# Patient Record
(demographics unavailable — no encounter records)

---

## 2025-05-21 NOTE — HISTORY OF PRESENT ILLNESS
[FreeTextEntry1] : New patient-Comprehensive to establish care [de-identified] : ZAHRA GRIMES is a 25-year-old F with past medical history as below who presents today to the office as new patient annual wellness visit and to establish care. Patient has relevant history of anxiety, depression, adult ADHD and sees NP. She is currently taking Vraylar 1.5mg, lamotrigine 150 mg, oxybutynin ER 10mg, Vyanse 50mg, Vitamin D 5000IU, sertraline 100mg and Albuterol prn. 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    Surgical HX: strabismus correction Social HX: no cigarette smoking, does smoke marijuana and drinks alcohol socially. 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  Family HX: Father: HTN Mother: asthma Allergy: morphine  Patient has had flu vaccine for 8992-2759, vaccinated initial dose of covid-19 (x2) and is vaccinated against tetanus 2020.  She visited a GYN last in 2023 and is not UTD.    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 She currently does not have an exercise routine.   Patient is not fasting today.

## 2025-05-21 NOTE — PLAN
[FreeTextEntry1] : Pulmonology intermittent Asthma- continue with Albuterol 108mcg, two puffs prn as directed. Psychiatry anxiety/depression- continue with Lamotrigine 150mg p.o.q.d as directed; c/w Sertraline HCI 100mg p.o.q.d as directed; c/w Vraylar 1.5mg p.o.q.d as directed; c/w oxybutynin chloride ER 10mg p.o.q.d as directed ADHD- continue with Vyanase 50mg p.o.q.d as directed; continue to follow up with therapist, psych NP. Endocrine Vitamin D deficiency- c/w Vitamin D 5000IU as advised; -advised to maintain a balance diet and start an exercise regimen GYN -advised to follow up with gynecologist for annual visit   Rx provided for fasting blood work; recommended to visit [facility] labs Check EKG (as above); pending lab- Female Panel, HBA1C, CBC, Lipid Profile, CMP, TSH, and Vitamin D.

## 2025-05-21 NOTE — PHYSICAL EXAM
[No Acute Distress] : no acute distress [Well Nourished] : well nourished [Well Developed] : well developed [Well-Appearing] : well-appearing [Normal Sclera/Conjunctiva] : normal sclera/conjunctiva [PERRL] : pupils equal round and reactive to light [EOMI] : extraocular movements intact [Normal Oropharynx] : the oropharynx was normal [No JVD] : no jugular venous distention [No Lymphadenopathy] : no lymphadenopathy [Supple] : supple [Thyroid Normal, No Nodules] : the thyroid was normal and there were no nodules present [No Respiratory Distress] : no respiratory distress  [No Accessory Muscle Use] : no accessory muscle use [Clear to Auscultation] : lungs were clear to auscultation bilaterally [Normal Rate] : normal rate  [Regular Rhythm] : with a regular rhythm [Normal S1, S2] : normal S1 and S2 [No Murmur] : no murmur heard [No Carotid Bruits] : no carotid bruits [No Abdominal Bruit] : a ~M bruit was not heard ~T in the abdomen [No Varicosities] : no varicosities [Pedal Pulses Present] : the pedal pulses are present [No Edema] : there was no peripheral edema [No Palpable Aorta] : no palpable aorta [No Extremity Clubbing/Cyanosis] : no extremity clubbing/cyanosis [Soft] : abdomen soft [Non Tender] : non-tender [Non-distended] : non-distended [No Masses] : no abdominal mass palpated [No HSM] : no HSM [Normal Bowel Sounds] : normal bowel sounds [Normal Posterior Cervical Nodes] : no posterior cervical lymphadenopathy [Normal Anterior Cervical Nodes] : no anterior cervical lymphadenopathy [No CVA Tenderness] : no CVA  tenderness [No Spinal Tenderness] : no spinal tenderness [No Joint Swelling] : no joint swelling [Grossly Normal Strength/Tone] : grossly normal strength/tone [No Rash] : no rash [Coordination Grossly Intact] : coordination grossly intact [No Focal Deficits] : no focal deficits [Normal Gait] : normal gait [Deep Tendon Reflexes (DTR)] : deep tendon reflexes were 2+ and symmetric [Normal Affect] : the affect was normal [Normal Insight/Judgement] : insight and judgment were intact [de-identified] : bilateral hearing aids

## 2025-05-21 NOTE — HISTORY OF PRESENT ILLNESS
[FreeTextEntry1] : New patient-Comprehensive to establish care [de-identified] : ZAHRA GRIMES is a 25-year-old F with past medical history as below who presents today to the office as new patient annual wellness visit and to establish care. Patient has relevant history of anxiety, depression, adult ADHD and sees NP. She is currently taking Vraylar 1.5mg, lamotrigine 150 mg, oxybutynin ER 10mg, Vyanse 50mg, Vitamin D 5000IU, sertraline 100mg and Albuterol prn. 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    Surgical HX: strabismus correction Social HX: no cigarette smoking, does smoke marijuana and drinks alcohol socially. 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  Family HX: Father: HTN Mother: asthma Allergy: morphine  Patient has had flu vaccine for [id number], vaccinated initial dose of covid-19 (x2) and is vaccinated against tetanus 2020.  She visited a GYN last in 2023 and is not UTD.    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 She currently does not have an exercise routine.   Patient is not fasting today.

## 2025-05-21 NOTE — PLAN
[FreeTextEntry1] : Pulmonology intermittent Asthma- continue with Albuterol 108mcg, two puffs prn as directed. Psychiatry anxiety/depression- continue with Lamotrigine 150mg p.o.q.d as directed; c/w Sertraline HCI 100mg p.o.q.d as directed; c/w Vraylar 1.5mg p.o.q.d as directed; c/w oxybutynin chloride ER 10mg p.o.q.d as directed ADHD- continue with Vyanase 50mg p.o.q.d as directed; continue to follow up with therapist, psych NP. Endocrine Vitamin D deficiency- c/w Vitamin D 5000IU as advised; -advised to maintain a balance diet and start an exercise regimen GYN -advised to follow up with gynecologist for annual visit   Rx provided for fasting blood work; recommended to visit [facility] labs Check EKG (as above); pending lab- Female Panel, HBA1C, CBC, Lipid Profile, CMP, TSH, and Vitamin D.

## 2025-05-21 NOTE — HEALTH RISK ASSESSMENT
[Good] : ~his/her~  mood as  good [Monthly or less (1 pt)] : Monthly or less (1 point) [1 or 2 (0 pts)] : 1 or 2 (0 points) [Never (0 pts)] : Never (0 points) [Yes] : In the past 12 months have you used drugs other than those required for medical reasons? Yes [Little interest or pleasure doing things] : 1) Little interest or pleasure doing things [Feeling down, depressed, or hopeless] : 2) Feeling down, depressed, or hopeless [1] : 2) Feeling down, depressed, or hopeless for several days (1) [PHQ-2 Negative - No further assessment needed] : PHQ-2 Negative - No further assessment needed [Audit-CScore] : 1 [de-identified] : Marijuana [[id number]] : 2

## 2025-05-21 NOTE — HEALTH RISK ASSESSMENT
[Good] : ~his/her~  mood as  good [Monthly or less (1 pt)] : Monthly or less (1 point) [1 or 2 (0 pts)] : 1 or 2 (0 points) [Never (0 pts)] : Never (0 points) [Yes] : In the past 12 months have you used drugs other than those required for medical reasons? Yes [Little interest or pleasure doing things] : 1) Little interest or pleasure doing things [Feeling down, depressed, or hopeless] : 2) Feeling down, depressed, or hopeless [1] : 2) Feeling down, depressed, or hopeless for several days (1) [PHQ-2 Negative - No further assessment needed] : PHQ-2 Negative - No further assessment needed [Audit-CScore] : 1 [de-identified] : Marijuana [[id number]] : 2

## 2025-05-21 NOTE — PHYSICAL EXAM
[No Acute Distress] : no acute distress [Well Nourished] : well nourished [Well Developed] : well developed [Well-Appearing] : well-appearing [Normal Sclera/Conjunctiva] : normal sclera/conjunctiva [PERRL] : pupils equal round and reactive to light [EOMI] : extraocular movements intact [Normal Oropharynx] : the oropharynx was normal [No JVD] : no jugular venous distention [No Lymphadenopathy] : no lymphadenopathy [Supple] : supple [Thyroid Normal, No Nodules] : the thyroid was normal and there were no nodules present [No Respiratory Distress] : no respiratory distress  [No Accessory Muscle Use] : no accessory muscle use [Clear to Auscultation] : lungs were clear to auscultation bilaterally [Normal Rate] : normal rate  [Regular Rhythm] : with a regular rhythm [Normal S1, S2] : normal S1 and S2 [No Murmur] : no murmur heard [No Carotid Bruits] : no carotid bruits [No Abdominal Bruit] : a ~M bruit was not heard ~T in the abdomen [No Varicosities] : no varicosities [Pedal Pulses Present] : the pedal pulses are present [No Edema] : there was no peripheral edema [No Palpable Aorta] : no palpable aorta [No Extremity Clubbing/Cyanosis] : no extremity clubbing/cyanosis [Soft] : abdomen soft [Non Tender] : non-tender [Non-distended] : non-distended [No Masses] : no abdominal mass palpated [No HSM] : no HSM [Normal Bowel Sounds] : normal bowel sounds [Normal Posterior Cervical Nodes] : no posterior cervical lymphadenopathy [Normal Anterior Cervical Nodes] : no anterior cervical lymphadenopathy [No CVA Tenderness] : no CVA  tenderness [No Spinal Tenderness] : no spinal tenderness [No Joint Swelling] : no joint swelling [Grossly Normal Strength/Tone] : grossly normal strength/tone [No Rash] : no rash [Coordination Grossly Intact] : coordination grossly intact [No Focal Deficits] : no focal deficits [Normal Gait] : normal gait [Deep Tendon Reflexes (DTR)] : deep tendon reflexes were 2+ and symmetric [Normal Affect] : the affect was normal [Normal Insight/Judgement] : insight and judgment were intact [de-identified] : bilateral hearing aids

## 2025-05-21 NOTE — END OF VISIT
[FreeTextEntry3] : I, Onofre Ballesteros, acted solely as scribe for Dr. Juanito Mckee DO on this date 05/20/2025.   All medical record entries made by the Scribe were at my, Dr. Juanito Mckee DO direction and personally dictated by me on 05/20/2025, I have reviewed the chart and agree that the record accurately reflects my personal performance of the history, physical exam, assessment and plan. I have also personally directed, reviewed and agreed with the chart.

## 2025-05-21 NOTE — ASSESSMENT
[Vaccines Reviewed] : Immunizations reviewed today. Please see immunization details in the vaccine log within the immunization flowsheet.  [FreeTextEntry1] : ZAHRA GRIMES is a 25-year-old F with past medical history as above who presents today to the office as new patient annual wellness visit and to establish care.